# Patient Record
Sex: FEMALE | Race: BLACK OR AFRICAN AMERICAN | Employment: UNEMPLOYED | URBAN - METROPOLITAN AREA
[De-identification: names, ages, dates, MRNs, and addresses within clinical notes are randomized per-mention and may not be internally consistent; named-entity substitution may affect disease eponyms.]

---

## 2022-04-29 ENCOUNTER — APPOINTMENT (OUTPATIENT)
Dept: ULTRASOUND IMAGING | Facility: CLINIC | Age: 27
End: 2022-04-29
Attending: EMERGENCY MEDICINE

## 2022-04-29 ENCOUNTER — HOSPITAL ENCOUNTER (EMERGENCY)
Facility: CLINIC | Age: 27
Discharge: HOME | End: 2022-04-29
Attending: EMERGENCY MEDICINE | Admitting: EMERGENCY MEDICINE

## 2022-04-29 VITALS
DIASTOLIC BLOOD PRESSURE: 74 MMHG | RESPIRATION RATE: 16 BRPM | OXYGEN SATURATION: 97 % | WEIGHT: 125.2 LBS | HEART RATE: 103 BPM | TEMPERATURE: 98.2 F | SYSTOLIC BLOOD PRESSURE: 124 MMHG

## 2022-04-29 DIAGNOSIS — E05.90 HYPERTHYROIDISM: ICD-10-CM

## 2022-04-29 LAB
ALBUMIN SERPL-MCNC: 3.5 G/DL (ref 3.4–5)
ALBUMIN UR-MCNC: NEGATIVE MG/DL
ALP SERPL-CCNC: 182 U/L (ref 40–150)
ALT SERPL W P-5'-P-CCNC: 20 U/L (ref 0–50)
ANION GAP SERPL CALCULATED.3IONS-SCNC: 7 MMOL/L (ref 3–14)
APPEARANCE UR: CLEAR
AST SERPL W P-5'-P-CCNC: 10 U/L (ref 0–45)
BASOPHILS # BLD AUTO: 0 10E3/UL (ref 0–0.2)
BASOPHILS NFR BLD AUTO: 0 %
BILIRUB SERPL-MCNC: 0.4 MG/DL (ref 0.2–1.3)
BILIRUB UR QL STRIP: NEGATIVE
BUN SERPL-MCNC: 12 MG/DL (ref 7–30)
CALCIUM SERPL-MCNC: 10.1 MG/DL (ref 8.5–10.1)
CHLORIDE BLD-SCNC: 106 MMOL/L (ref 94–109)
CO2 SERPL-SCNC: 27 MMOL/L (ref 20–32)
COLOR UR AUTO: ABNORMAL
CREAT SERPL-MCNC: 0.4 MG/DL (ref 0.52–1.04)
EOSINOPHIL # BLD AUTO: 0.3 10E3/UL (ref 0–0.7)
EOSINOPHIL NFR BLD AUTO: 5 %
ERYTHROCYTE [DISTWIDTH] IN BLOOD BY AUTOMATED COUNT: 13.3 % (ref 10–15)
GFR SERPL CREATININE-BSD FRML MDRD: >90 ML/MIN/1.73M2
GLUCOSE BLD-MCNC: 92 MG/DL (ref 70–99)
GLUCOSE UR STRIP-MCNC: NEGATIVE MG/DL
HCG UR QL: NEGATIVE
HCT VFR BLD AUTO: 38.4 % (ref 35–47)
HGB BLD-MCNC: 12.6 G/DL (ref 11.7–15.7)
HGB UR QL STRIP: NEGATIVE
HOLD SPECIMEN: NORMAL
IMM GRANULOCYTES # BLD: 0 10E3/UL
IMM GRANULOCYTES NFR BLD: 0 %
KETONES UR STRIP-MCNC: NEGATIVE MG/DL
LEUKOCYTE ESTERASE UR QL STRIP: NEGATIVE
LYMPHOCYTES # BLD AUTO: 1.8 10E3/UL (ref 0.8–5.3)
LYMPHOCYTES NFR BLD AUTO: 36 %
MCH RBC QN AUTO: 25.5 PG (ref 26.5–33)
MCHC RBC AUTO-ENTMCNC: 32.8 G/DL (ref 31.5–36.5)
MCV RBC AUTO: 78 FL (ref 78–100)
MONOCYTES # BLD AUTO: 0.5 10E3/UL (ref 0–1.3)
MONOCYTES NFR BLD AUTO: 11 %
MUCOUS THREADS #/AREA URNS LPF: PRESENT /LPF
NEUTROPHILS # BLD AUTO: 2.4 10E3/UL (ref 1.6–8.3)
NEUTROPHILS NFR BLD AUTO: 48 %
NITRATE UR QL: NEGATIVE
NRBC # BLD AUTO: 0 10E3/UL
NRBC BLD AUTO-RTO: 0 /100
PH UR STRIP: 6.5 [PH] (ref 5–7)
PLATELET # BLD AUTO: 307 10E3/UL (ref 150–450)
POTASSIUM BLD-SCNC: 3.8 MMOL/L (ref 3.4–5.3)
PROT SERPL-MCNC: 8 G/DL (ref 6.8–8.8)
RBC # BLD AUTO: 4.95 10E6/UL (ref 3.8–5.2)
RBC URINE: 0 /HPF
SODIUM SERPL-SCNC: 140 MMOL/L (ref 133–144)
SP GR UR STRIP: 1.02 (ref 1–1.03)
SQUAMOUS EPITHELIAL: 4 /HPF
T4 FREE SERPL-MCNC: 4.22 NG/DL (ref 0.76–1.46)
TSH SERPL DL<=0.005 MIU/L-ACNC: <0.01 MU/L (ref 0.4–4)
UROBILINOGEN UR STRIP-MCNC: NORMAL MG/DL
WBC # BLD AUTO: 5 10E3/UL (ref 4–11)
WBC URINE: 0 /HPF

## 2022-04-29 PROCEDURE — 85025 COMPLETE CBC W/AUTO DIFF WBC: CPT | Performed by: EMERGENCY MEDICINE

## 2022-04-29 PROCEDURE — 99284 EMERGENCY DEPT VISIT MOD MDM: CPT | Mod: 25 | Performed by: EMERGENCY MEDICINE

## 2022-04-29 PROCEDURE — 81025 URINE PREGNANCY TEST: CPT | Performed by: EMERGENCY MEDICINE

## 2022-04-29 PROCEDURE — 80053 COMPREHEN METABOLIC PANEL: CPT | Performed by: EMERGENCY MEDICINE

## 2022-04-29 PROCEDURE — 36415 COLL VENOUS BLD VENIPUNCTURE: CPT | Performed by: EMERGENCY MEDICINE

## 2022-04-29 PROCEDURE — 84439 ASSAY OF FREE THYROXINE: CPT | Performed by: EMERGENCY MEDICINE

## 2022-04-29 PROCEDURE — 99284 EMERGENCY DEPT VISIT MOD MDM: CPT | Performed by: EMERGENCY MEDICINE

## 2022-04-29 PROCEDURE — 81001 URINALYSIS AUTO W/SCOPE: CPT | Performed by: EMERGENCY MEDICINE

## 2022-04-29 PROCEDURE — 76536 US EXAM OF HEAD AND NECK: CPT

## 2022-04-29 PROCEDURE — 84443 ASSAY THYROID STIM HORMONE: CPT | Performed by: EMERGENCY MEDICINE

## 2022-04-29 RX ORDER — METHIMAZOLE 10 MG/1
20 TABLET ORAL DAILY
Qty: 60 TABLET | Refills: 0 | Status: SHIPPED | OUTPATIENT
Start: 2022-04-29 | End: 2022-06-06

## 2022-04-29 RX ORDER — PROPRANOLOL HYDROCHLORIDE 40 MG/1
40 TABLET ORAL 2 TIMES DAILY
Qty: 60 TABLET | Refills: 0 | Status: SHIPPED | OUTPATIENT
Start: 2022-04-29 | End: 2022-06-06

## 2022-04-29 NOTE — ED TRIAGE NOTES
Hoarse voice     Triage Assessment     Row Name 04/29/22 1554       Triage Assessment (Adult)    Airway WDL airway symptoms    Airway Symptoms other (see comments)  hoarse       Respiratory WDL    Respiratory WDL WDL       Cardiac WDL    Cardiac WDL WDL

## 2022-04-29 NOTE — ED PROVIDER NOTES
[facility] EMERGENCY DEPARTMENT ([facility])       4/29/22  History     Chief Complaint   Patient presents with     Thyroid Problem     Treated for thyroid issues and neck swelling in [place]; arrived [place] Feb 16th, 2022; neck swelling has worsened; reports difficulty swallowing and breathing is difficulty     The history is provided by the patient, medical records and a significant other. A  was used (Armenian (patient's partner is able to translate for patient)).     Gabby Concepcion is a 27 year old female who presents to the Emergency Department for evaluation of throat swelling and difficulty swallowing.    Patient reports that she has been having ongoing chronic thyroid issues and neck swelling since living in [place].  She adds that she arrived to [place] on 02/16/2022.  She says that her thyroid issues are being treated with propanolol and Dimazol 10 mg (Carbimazole) and has been on these medications for about 1 year. Patient denies any missed doses of her medications. She notes that the medications used to help with her throat swelling and thyroid issues but recently the meds have not been working for her.  She says that within the last month her throat swelling has been increasing which has been causing difficulty with breathing and difficulty with swallowing.  She notes that the throat swelling is painful in nature.  She adds that she has been experiencing a heart racing sensation along with feeling slightly short of breath.  Patient reports that she does not have established care with a PCP for this in the [place].  Patient denies fever, chills, chest pain, cough, and shortness of breath.    Social history: Patient reports that she is currently fasting for Ramadan and is not allowed to break her fast until 8 PM.    No past medical history on file.    No past surgical history on file.    No family history on file.    Social History     Tobacco Use     Smoking status:  Not on file     Smokeless tobacco: Not on file   Substance Use Topics     Alcohol use: Not on file       No current facility-administered medications for this encounter.     Current Outpatient Medications   Medication     methimazole (TAPAZOLE) 10 MG tablet     propranolol (INDERAL) 40 MG tablet      No Known Allergies     I have reviewed the Medications, Allergies, Past Medical and Surgical History, and Social History in the Epic system.    Review of Systems  A complete review of systems was performed with pertinent positives and negatives noted in the HPI, and all other systems negative.    Physical Exam   BP: 123/77  Pulse: 95  Temp: 98.2  F (36.8  C)  Resp: 18  Weight: 56.8 kg (125 lb 3.2 oz)  SpO2: 97 %      Physical Exam  HENT:      Head: Normocephalic and atraumatic.      Nose: Nose normal.      Mouth/Throat:      Mouth: Mucous membranes are moist.   Eyes:      Extraocular Movements: Extraocular movements intact.      Pupils: Pupils are equal, round, and reactive to light.   Neck:      Comments: Diffusely enlarged and palpable thyroid    Airway is patent no signs of airway compromise patient is tolerating secretions  Cardiovascular:      Rate and Rhythm: Regular rhythm. Tachycardia present.      Pulses: Normal pulses.   Pulmonary:      Effort: Pulmonary effort is normal.      Breath sounds: No wheezing, rhonchi or rales.   Abdominal:      General: Abdomen is flat.      Palpations: Abdomen is soft.      Tenderness: There is no abdominal tenderness. There is no guarding or rebound.   Musculoskeletal:         General: No tenderness. Normal range of motion.      Cervical back: Normal range of motion.   Skin:     General: Skin is warm.   Neurological:      General: No focal deficit present.      Mental Status: She is alert and oriented to person, place, and time.         ED Course     At 4:12 PM the patient was seen and examined by Carlos Alberto Aggarwal MD in Room ED04.        Procedures              Results for orders  placed or performed during the hospital encounter of 04/29/22 (from the past 24 hour(s))   Taylor Draw    Narrative    The following orders were created for panel order Taylor Draw.  Procedure                               Abnormality         Status                     ---------                               -----------         ------                     Extra Blue Top Tube[[id number]]                              Final result               Extra Red Top Tube[[id number]]                               Final result               Extra Green Top (Lithium...[[id number]]                      Final result               Extra Purple Top Tube[[id number]]                            Final result                 Please view results for these tests on the individual orders.   Extra Blue Top Tube   Result Value Ref Range    Hold Specimen JIC    Extra Red Top Tube   Result Value Ref Range    Hold Specimen JIC    Extra Green Top (Lithium Heparin) Tube   Result Value Ref Range    Hold Specimen JIC    Extra Purple Top Tube   Result Value Ref Range    Hold Specimen JIC    CBC with platelets differential    Narrative    The following orders were created for panel order CBC with platelets differential.  Procedure                               Abnormality         Status                     ---------                               -----------         ------                     CBC with platelets and d...[[id number]]  Abnormal            Final result                 Please view results for these tests on the individual orders.   TSH with free T4 reflex   Result Value Ref Range    TSH <0.01 (L) 0.40 - 4.00 mU/L   Comprehensive metabolic panel   Result Value Ref Range    Sodium 140 133 - 144 mmol/L    Potassium 3.8 3.4 - 5.3 mmol/L    Chloride 106 94 - 109 mmol/L    Carbon Dioxide (CO2) 27 20 - 32 mmol/L    Anion Gap 7 3 - 14 mmol/L    Urea Nitrogen 12 7 - 30 mg/dL    Creatinine 0.40 (L) 0.52 - 1.04 mg/dL    Calcium 10.1 8.5 - 10.1 mg/dL     Glucose 92 70 - 99 mg/dL    Alkaline Phosphatase 182 (H) 40 - 150 U/L    AST 10 0 - 45 U/L    ALT 20 0 - 50 U/L    Protein Total 8.0 6.8 - 8.8 g/dL    Albumin 3.5 3.4 - 5.0 g/dL    Bilirubin Total 0.4 0.2 - 1.3 mg/dL    GFR Estimate >90 >60 mL/min/1.73m2   CBC with platelets and differential   Result Value Ref Range    WBC Count 5.0 4.0 - 11.0 10e3/uL    RBC Count 4.95 3.80 - 5.20 10e6/uL    Hemoglobin 12.6 11.7 - 15.7 g/dL    Hematocrit 38.4 35.0 - 47.0 %    MCV 78 78 - 100 fL    MCH 25.5 (L) 26.5 - 33.0 pg    MCHC 32.8 31.5 - 36.5 g/dL    RDW 13.3 10.0 - 15.0 %    Platelet Count 307 150 - 450 10e3/uL    % Neutrophils 48 %    % Lymphocytes 36 %    % Monocytes 11 %    % Eosinophils 5 %    % Basophils 0 %    % Immature Granulocytes 0 %    NRBCs per 100 WBC 0 <1 /100    Absolute Neutrophils 2.4 1.6 - 8.3 10e3/uL    Absolute Lymphocytes 1.8 0.8 - 5.3 10e3/uL    Absolute Monocytes 0.5 0.0 - 1.3 10e3/uL    Absolute Eosinophils 0.3 0.0 - 0.7 10e3/uL    Absolute Basophils 0.0 0.0 - 0.2 10e3/uL    Absolute Immature Granulocytes 0.0 <=0.4 10e3/uL    Absolute NRBCs 0.0 10e3/uL   T4 free   Result Value Ref Range    Free T4 4.22 (H) 0.76 - 1.46 ng/dL   UA with Microscopic reflex to Culture    Specimen: Urine, Clean Catch   Result Value Ref Range    Color Urine Light Yellow Colorless, Straw, Light Yellow, Yellow    Appearance Urine Clear Clear    Glucose Urine Negative Negative mg/dL    Bilirubin Urine Negative Negative    Ketones Urine Negative Negative mg/dL    Specific Gravity Urine 1.023 1.003 - 1.035    Blood Urine Negative Negative    pH Urine 6.5 5.0 - 7.0    Protein Albumin Urine Negative Negative mg/dL    Urobilinogen Urine Normal Normal, 2.0 mg/dL    Nitrite Urine Negative Negative    Leukocyte Esterase Urine Negative Negative    Mucus Urine Present (A) None Seen /LPF    RBC Urine 0 <=2 /HPF    WBC Urine 0 <=5 /HPF    Squamous Epithelials Urine 4 (H) <=1 /HPF    Narrative    Urine Culture not indicated   HCG  qualitative urine   Result Value Ref Range    hCG Urine Qualitative Negative Negative   US Head Neck Soft Tissue    Narrative    EXAM: US HEAD NECK SOFT TISSUE  LOCATION: [facility]  DATE/TIME: 4/29/2022 5:14 PM    INDICATION: Neck swelling.  COMPARISON: None.  TECHNIQUE: Routine.    FINDINGS:    RIGHT thyroid lobe measures 8.9 x 3 x 3.1 cm. Heterogeneous echotexture with no focal nodule.    LEFT thyroid lobe measures 8.5 x 3.1 x 3.8 cm. Heterogeneous echotexture with no focal nodule.    Isthmus measures 1.2 mm. No additional findings.    NODULES (characterize up to four nodules): None.    No cervical lymphadenopathy in the visualized portion of the neck.      Impression    IMPRESSION:  1. The thyroid gland is diffusely enlarged and heterogeneous.   2. No focal thyroid masses or nodules are identified.         Medications - No data to display          Assessments & Plan (with Medical Decision Making)   Patient nontoxic in no acute distress presents for neck swelling and feeling short of breath and having difficulty swallowing.  She is not stridorous she has no signs of airway compromise airway is patent.  Vitals are stable she does have tachycardia which in her history and setting and clinical picture are expected with hyperthyroidism.  She has a palpable enlarged thyroid anteriorly in the neck.  She is tolerating her secretions and able to swallow.  I do not feel that this is compromising her airway and that majority of her symptoms are due to her uncontrolled hyperthyroidism.  She is currently breast-feeding and plans to continue.  She does not have any local follow-up.  Laboratory work and ultrasound were obtained.  This fits the clinical picture of hyperthyroidism.  Pulses been in the low 100s.  Discussed with endocrinology to transition to US medications and appropriate new doses and to arrange for follow-up.  Following discussion with endocrinology plan is for  methimazole 20 mg daily and for propranolol 40 mg twice daily.  Patient can follow-up in endocrinology clinic for repeat labs and reassessment.  I discussed all this with the patient.  Outpatient referral is placed as well.  Discussed signs and symptoms to return to the emergency department.  Patient and family understand the plan.  Family has been interpreting throughout the entire visit.    I have reviewed the nursing notes.    I have reviewed the findings, diagnosis, plan and need for follow up with the patient.    New Prescriptions    METHIMAZOLE (TAPAZOLE) 10 MG TABLET    Take 2 tablets (20 mg) by mouth daily    PROPRANOLOL (INDERAL) 40 MG TABLET    Take 1 tablet (40 mg) by mouth 2 times daily       Final diagnoses:   Hyperthyroidism     IHeidy, am serving as a trained medical scribe to document services personally performed by Carlos Alberto Aggarwal MD, based on the provider's statements to me.      Carlos Alberto NIETO MD, was physically present and have reviewed and verified the accuracy of this note documented by Heidy Jamil.      Carlos Alberto Aggarwal MD  4/29/2022   [facility] EMERGENCY DEPARTMENT     Carlos Alberto Aggarwal MD  04/29/22 1952

## 2022-04-30 NOTE — DISCHARGE INSTRUCTIONS
Please make an appointment to follow up with endocrinology, Your Primary Care Provider, and Primary Care Center (phone: [phone]) in 14-21 days.

## 2022-05-16 ENCOUNTER — TELEPHONE (OUTPATIENT)
Dept: ENDOCRINOLOGY | Facility: CLINIC | Age: 27
End: 2022-05-16
Payer: COMMERCIAL

## 2022-05-16 NOTE — TELEPHONE ENCOUNTER
To schedulers : please schedule with consult service (or open new/ALTAGRACIA) within the week. This could be a virtual visit.      Cris Fagan MD  Endocrine triage

## 2022-05-16 NOTE — TELEPHONE ENCOUNTER
[facility] Call Center    Phone Message    May a detailed message be left on voicemail: yes     Reason for Call: Appointment Intake    Referring Provider Name: Carlos Alberto Aggarwal MD    Diagnosis and/or Symptoms: Hyperthyroidism [E05.90]     stated neck is swollen- was in ED on 4/29 and received medication.    Priority referral from 4/29: 1-2weeks.  Please review.      Scheduled next available in person on 10/5/22        Action Taken: Message routed to:  [facility] ([facility] endo    Travel Screening: Not Applicable

## 2022-05-17 NOTE — TELEPHONE ENCOUNTER
RECORDS RECEIVED FROM: internal    DATE RECEIVED: 5.18.22   NOTES (FOR ALL VISITS) STATUS DETAILS   OFFICE NOTES from referring provider internal  Carlos Alberto Aggarwal MD   OFFICE NOTES from other specialist     ED NOTES internal  4.29.22 Alessia CEDENO    OPERATIVE REPORT  (thyroid, pituitary, adrenal, parathyroid)     MEDICATION LIST internal     IMAGING      DEXASCAN     MRI (BRAIN)     XR (Chest)     CT (HEAD/NECK/CHEST/ABDOMEN)     NUCLEAR      ULTRASOUND (HEAD/NECK) internal  4.29.22    LABS     DIABETES: HBGA1C, CREATININE, FASTING LIPIDS, MICROALBUMIN URINE, POTASSIUM, TSH, T4    THYROID: TSH, T4, CBC, THYRODLONULIN, TOTAL T3, FREE T4, CALCITONIN, CEA internal  CMP- 4.29.22  TSH/T4- 4.29.22   CMP- 4.29.22

## 2022-05-18 ENCOUNTER — VIRTUAL VISIT (OUTPATIENT)
Dept: ENDOCRINOLOGY | Facility: CLINIC | Age: 27
End: 2022-05-18
Payer: COMMERCIAL

## 2022-05-18 ENCOUNTER — PRE VISIT (OUTPATIENT)
Dept: ENDOCRINOLOGY | Facility: CLINIC | Age: 27
End: 2022-05-18
Payer: COMMERCIAL

## 2022-05-18 DIAGNOSIS — E05.90 HYPERTHYROIDISM: ICD-10-CM

## 2022-05-18 DIAGNOSIS — E05.00 GRAVES' DISEASE: Primary | ICD-10-CM

## 2022-05-18 PROCEDURE — 99205 OFFICE O/P NEW HI 60 MIN: CPT | Mod: GT | Performed by: INTERNAL MEDICINE

## 2022-05-18 NOTE — PROGRESS NOTES
Gabby is a 27 year old who is being evaluated via a billable video visit.      How would you like to obtain your AVS? MyChart  If the video visit is dropped, the invitation should be resent by: Text to cell phone: [phone]   Will anyone else be joining your video visit? No     Video visit  Start: 4:30 pm  End: 5:20 pm   Doximity and with                                                                              - Endocrinology Initial Consultation -    Reason for visit/consult:  Graves' disease    Primary care provider: No Ref-Primary, Physician    HPI: A 28 yo female here for the evaluation of her hyperthyroidism.   The patient came from [place] 3 months ago to [place].    Danish  accompanied for this session.  Patient was diagnosed hypothyroidism 4 years ago when she was in [place].  She recalls she has been taking methimazole 10 mg twice a day and beta-blocker.  Last year when she was in pregnancy she recalls that she was still taking methimazole 10 mg twice a day, she was told after the pregnancy thyroid level need to be checked about 6 months ago she has not checked and came to [place] 3 months ago.  She brought to methimazole and has been taking however she started to have worsening palpitation shortness of breath neck tenderness and feeling shaky for the first few months therefore she went to emergency department, her free T4 was 4.2 and TSH was undetectable she was prescribed methimazole 20 mg twice a daily and propranolol.  Currently she is feeling better.  She has a 6 and half months of the child and has been on breast-feeding.  Also she mentioned 3 months ago she received the second vaccine of COVID-19 by the [employer]. She also mentioned health concern related to future second pregnancy.      Past Medical/Surgical History:  No past medical history on file.  No past surgical history on file.    Allergies:  No Known Allergies    Current Medications   Current Outpatient  Medications   Medication     methimazole (TAPAZOLE) 10 MG tablet     propranolol (INDERAL) 40 MG tablet     No current facility-administered medications for this visit.       Family History:  No family history on file.    Social History:  Social History     Tobacco Use     Smoking status: Never Smoker     Smokeless tobacco: Never Used   Substance Use Topics     Alcohol use: Not on file   came to [place] in early 2022.     ROS:  Full review of systems taken with the help of the intake sheet. Otherwise a complete 14 point review of systems was taken and is negative unless stated in the history above.      Physical Exam:   Vitals: [facility] 04/02/2022   BMI= There is no height or weight on file to calculate BMI.   General: well appearing, no acute distress, pleasant and conversant,   Mental Status/neuro: alert and oriented  Face: symmetrical, normal facial color  Eyes: anicteric, no proptosis or lid lag  Resp: breathing normally  Hands: no tremor    Labs : I reviewed data from epic and extract and summarize the pertinent data here.   Lab Results   Component Value Date     04/29/2022      Lab Results   Component Value Date    POTASSIUM 3.8 04/29/2022     Lab Results   Component Value Date    CHLORIDE 106 04/29/2022     Lab Results   Component Value Date    BRUNO 10.1 04/29/2022     Lab Results   Component Value Date    CO2 27 04/29/2022     Lab Results   Component Value Date    BUN 12 04/29/2022     Lab Results   Component Value Date    CR 0.40 04/29/2022     Lab Results   Component Value Date    GLC 92 04/29/2022     Lab Results   Component Value Date    TSH <0.01 04/29/2022     Lab Results   Component Value Date    T4 4.22 04/29/2022     No results found for: A1C    No results found for: IGF1  No results found for: LH  No results found for: FSH  No results found for: ESTROGEN  No results found for: PROLACTIN        MRI Brain: I personally reviewed the original images and agree with the below reports.   No results found for  this or any previous visit.          Assessment and Plan  27 year old female with hyperthyroidism    Likely Grave's disease    - recheck TSH, free T4, total T3  - TSI, TSH receptor anb    - meanwhile continue current methimazole 20 mg BID and propranolol 40 mg BID    RTC with me in 3 months    Addendum: will continue current dose of methiamzole then recheck in 2 month.      Latest Reference Range & Units 05/20/22 18:00   T4 Free 0.76 - 1.46 ng/dL 2.70 (H)   Triiodothyronine (T3) 60 - 181 ng/dL 241 (H)   TSH 0.40 - 4.00 mU/L <0.01 (L)   (H): Data is abnormally high  (L): Data is abnormally low      Total 60 minutes spent on the date of the encounter doing chart review, history and exam, answering questions from the patient, documentation and further activities as noted above.        Aletha Melton MD  Staff Physician  Endocrinology and Metabolism  License: [id number]

## 2022-05-18 NOTE — LETTER
Date:May 19, 2022      Provider requested that no letter be sent. Do not send.       [facility]

## 2022-05-18 NOTE — LETTER
5/18/2022       RE: Gabby Concepcion  [address]  [place] [facility] [address]     Dear Colleague,    Thank you for referring your patient, Gabby Concepcion, to the [facility] ENDOCRINOLOGY CLINIC [place] [facility]. Please see a copy of my visit note below.    Gabby is a 27 year old who is being evaluated via a billable video visit.      How would you like to obtain your AVS? MyChart  If the video visit is dropped, the invitation should be resent by: Text to cell phone: [phone]   Will anyone else be joining your video visit? No     Video visit  Start: 4:30 pm  End: 5:20 pm   Doximity and with                                                                              - Endocrinology Initial Consultation -    Reason for visit/consult:  Graves' disease    Primary care provider: No Ref-Primary, Physician    HPI: A 28 yo female here for the evaluation of her hyperthyroidism.   The patient came from [place] 3 months ago to [place].    Romansh  accompanied for this session.  Patient was diagnosed hypothyroidism 4 years ago when she was in [place].  She recalls she has been taking methimazole 10 mg twice a day and beta-blocker.  Last year when she was in pregnancy she recalls that she was still taking methimazole 10 mg twice a day, she was told after the pregnancy thyroid level need to be checked about 6 months ago she has not checked and came to [place] 3 [place] ago.  She brought to methimazole and has been taking however she started to have worsening palpitation shortness of breath neck tenderness and feeling shaky for the first few months therefore she went to emergency department, her free T4 was 4.2 and TSH was undetectable she was prescribed methimazole 20 mg twice a daily and propranolol.  Currently she is feeling better.  She has a 6 and half months of the child and has been on breast-feeding.  Also she mentioned 3 months  ago she received the second vaccine of COVID-19 by the [employer]. She also mentioned health concern related to future second pregnancy.      Past Medical/Surgical History:  No past medical history on file.  No past surgical history on file.    Allergies:  No Known Allergies    Current Medications   Current Outpatient Medications   Medication     methimazole (TAPAZOLE) 10 MG tablet     propranolol (INDERAL) 40 MG tablet     No current facility-administered medications for this visit.       Family History:  No family history on file.    Social History:  Social History     Tobacco Use     Smoking status: Never Smoker     Smokeless tobacco: Never Used   Substance Use Topics     Alcohol use: Not on file   came to [place] in early [place]22.     ROS:  Full review of systems taken with the help of the intake sheet. Otherwise a complete 14 point review of systems was taken and is negative unless stated in the history above.      Physical Exam:   Vitals: [facility] 04/02/2022   BMI= There is no height or weight on file to calculate BMI.   General: well appearing, no acute distress, pleasant and conversant,   Mental Status/neuro: alert and oriented  Face: symmetrical, normal facial color  Eyes: anicteric, no proptosis or lid lag  Resp: breathing normally  Hands: no tremor    Labs : I reviewed data from epic and extract and summarize the pertinent data here.   Lab Results   Component Value Date     04/29/2022      Lab Results   Component Value Date    POTASSIUM 3.8 04/29/2022     Lab Results   Component Value Date    CHLORIDE 106 04/29/2022     Lab Results   Component Value Date    BRUNO 10.1 04/29/2022     Lab Results   Component Value Date    CO2 27 04/29/2022     Lab Results   Component Value Date    BUN 12 04/29/2022     Lab Results   Component Value Date    CR 0.40 04/29/2022     Lab Results   Component Value Date    GLC 92 04/29/2022     Lab Results   Component Value Date    TSH <0.01 04/29/2022     Lab Results   Component Value Date     T4 4.22 04/29/2022     No results found for: A1C    No results found for: IGF1  No results found for: LH  No results found for: FSH  No results found for: ESTROGEN  No results found for: PROLACTIN        MRI Brain: I personally reviewed the original images and agree with the below reports.   No results found for this or any previous visit.          Assessment and Plan  27 year old female with hyperthyroidism    Likely Grave's disease    - recheck TSH, free T4, total T3  - TSI, TSH receptor anb    - meanwhile continue current methimazole 20 mg BID and propranolol 40 mg BID    RTC with me in 3 months      Total 60 minutes spent on the date of the encounter doing chart review, history and exam, answering questions from the patient, documentation and further activities as noted above.        Aletha Melton MD  Staff Physician  Endocrinology and Metabolism  License: [id number]        Again, thank you for allowing me to participate in the care of your patient.      Sincerely,    Aletha Melton MD

## 2022-05-19 ENCOUNTER — TELEPHONE (OUTPATIENT)
Dept: ENDOCRINOLOGY | Facility: CLINIC | Age: 27
End: 2022-05-19
Payer: COMMERCIAL

## 2022-05-19 NOTE — TELEPHONE ENCOUNTER
Inst[facility] to schedule lab draw [facility] with language [facility].   Eloisa Hooper RN on 5/19/2022 at 8:08 AM

## 2022-05-19 NOTE — TELEPHONE ENCOUNTER
----- Message from Aletha Melton MD sent at 5/18/2022  4:59 PM CDT -----  Regarding: could you communicating with her and assist to set up lab work  She is Georgian speaking, just came to [place] 3 months ago and confused [employer] [facility] system.     Could you explain her with  and assist to set up lab appt?    Thank you very much    Aletha

## 2022-05-19 NOTE — LETTER
"Dr Geronimo Rocha has placed lab orders for you. Please call  to schedule the lab draw at the [facility] lab that is most convenient for you at a time that works best for you. Dr Melton will be able to see the results as soon as they are available.     If you  prefer to set up the lab visit through [employer] here are the steps:     How do I schedule a lab-only appointment?  You can request lab only appointments through [employer] or schedule with a primary care clinic. Your provider will order the tests.    To schedule an appointment, click on the \"Schedule an Appointment\" option under the Find Care section of your menu.  Select Lab Only for the reason for visit  Choose the clinic location you would like the lab drawn and click Continue  Choose the Date and Time for the appointment  Verify your personal information, click \"This information is Correct\"  Verify your insurance, click \"This information is Correct\"  Type comments in the space provided  Click Schedule      Thank you.       ãÑÍÈÇ ÏáíáÉ      áÞÏ æÖÚ ÇáÏßÊæÑ ÃÑÇßí ØáÈÇÊ ãÚãáíÉ áß. íÑÌì ÇáÇÊÕÇá Úáì [id number] áÊÍÏíÏ ãæÚÏ ÇáÓÍÈ ÇáãÎÊÈÑí Ýí ãÎÊÈÑ [facility] ÇáÃßËÑ ãáÇÁãÉ áß Ýí ÇáæÞÊ ÇáÐí íäÇÓÈß ÈÔßá ÃÝÖá. ÓíÊãßä ÇáÏßÊæÑ ÃÑÇßí ãä ÑÄíÉ ÇáäÊÇÆÌ ÈãÌÑÏ ÊæÝÑåÇ.    ÅÐÇ ßäÊ ÊÝÖá ÅÚÏÇÏ ÒíÇÑÉ ÇáãÎÊÈÑ ãä ÎáÇá [employer] ÝÅáíß ÇáÎØæÇÊ:    ßíÝ ÃÍÏÏ ãæÚÏðÇ ãÚãáðÇ ÝÞØ   íãßäß ØáÈ ÇáãæÇÚíÏ ÇáãÎÊÈÑíÉ ÝÞØ ãä ÎáÇá MyChart Ãæ ÊÍÏíÏ ãæÚÏ ãÚ ÚíÇÏÉ ÇáÑÚÇíÉ ÇáÃæáíÉ. ÓíØáÈ ãÒæÏß ÇáÇÎÊÈÇÑÇÊ.    áÊÍÏíÏ ãæÚÏ   ÇäÞÑ ÝæÞ ÎíÇÑ \"ÊÍÏíÏ ãæÚÏ\" Öãä ÞÓã \"ÇáÈÍË Úä ÑÚÇíÉ\" Ýí ÞÇÆãÊß.  ÍÏÏ Lab Only áÓÈÈ ÇáÒíÇÑÉ  ÇÎÊÑ ãæÞÚ ÇáÚíÇÏÉ ÇáÐí ÊÑÛÈ Ýí ÑÓã ÇáãÚãá Èå æÇäÞÑ ÝæÞ \"ãÊÇÈÚÉ\"  ÇÎÊÑ ÊÇÑíÎ ææÞÊ ÇáãæÚÏ  ÊÍÞÞ ãä ãÚáæãÇÊß ÇáÔÎÕíÉ   æÇäÞÑ Úáì \"åÐå ÇáãÚáæãÇÊ ÕÍíÍÉ\"  ÊÍÞÞ ãä ÇáÊÃãíä ÇáÎÇÕ Èß   ÇäÞÑ ÝæÞ \"åÐå ÇáãÚáæãÇÊ ÕÍíÍÉ\"  ÇßÊÈ ÇáÊÚáíÞÇÊ Ýí ÇáãÓÇÍÉ ÇáãÊæÝÑÉ  ÇäÞÑ ÝæÞ ÌÏæáÉ      ÔßÑðÇ áß.  marhaban dalilat , laqad wadae alduktur 'araki talabat maemaliatan lika. janay " "aliatisal ealaa [id number] litahdid maweid alsahb almukhtabarii fi mukhtabar [facility] al'akthar jerry'amatan lak fi alwaqt aladhi yunasibuk bishakl 'afdala. sayatamakan alduktur 'araki min ruyat alnatayij bimujarad tawafurha.  'iidha kunt tufadil 'iiedad ziarat almukhtabar min khilal MyCHart fa'iilayk alkhutawati:  kayf 'uhadid mwedan memlan faqat?  yumkinuk talab almawaeid almukhtabariat faqat min khilal MyChart 'aw tahdid maweid perez eiadat alrieayat al'awaliati. sayatlub muzawadak alaikhtibarati.  litahdid maweid , ainqur fawq khiar \"tahdid maweidi\" dimn qism \"albahth mode rieayatin\" fi qayimatika.  hadid Lab Only lisabab alziyara  aikhtar mawqie aleiadat aladhi targhab fi rasm almaemal bih wanqur fawq \"mutabaeati\"  aikhtar tarikh wawaqt almaweid  tahaqaq min maelumatik alshakhsiat , wainqarr ealaa \"hadhih almaelumat sahihatun\"  tahaqaq min altaamin alkhasi bik , ainqur fawq \"hadhih almaelumat sahihatun\"  auktub altaeliqat fi almisahat almutawafira  ainqur fawq jadwala  shkran salbador.  "

## 2022-05-20 ENCOUNTER — LAB (OUTPATIENT)
Dept: LAB | Facility: CLINIC | Age: 27
End: 2022-05-20

## 2022-05-20 DIAGNOSIS — E05.00 GRAVES' DISEASE: ICD-10-CM

## 2022-05-20 PROCEDURE — 84445 ASSAY OF TSI GLOBULIN: CPT | Mod: 90

## 2022-05-20 PROCEDURE — 84439 ASSAY OF FREE THYROXINE: CPT

## 2022-05-20 PROCEDURE — 83520 IMMUNOASSAY QUANT NOS NONAB: CPT | Mod: 90

## 2022-05-20 PROCEDURE — 84480 ASSAY TRIIODOTHYRONINE (T3): CPT

## 2022-05-20 PROCEDURE — 99000 SPECIMEN HANDLING OFFICE-LAB: CPT

## 2022-05-20 PROCEDURE — 84443 ASSAY THYROID STIM HORMONE: CPT

## 2022-05-20 PROCEDURE — 36415 COLL VENOUS BLD VENIPUNCTURE: CPT

## 2022-05-21 LAB
T3 SERPL-MCNC: 241 NG/DL (ref 60–181)
T4 FREE SERPL-MCNC: 2.7 NG/DL (ref 0.76–1.46)
TSH SERPL DL<=0.005 MIU/L-ACNC: <0.01 MU/L (ref 0.4–4)

## 2022-05-24 LAB — TSH RECEP AB SER-ACNC: 21 IU/L (ref 0–1.75)

## 2022-05-27 LAB — TSI SER-ACNC: 4.8 TSI INDEX

## 2022-06-06 ENCOUNTER — TELEPHONE (OUTPATIENT)
Dept: ENDOCRINOLOGY | Facility: CLINIC | Age: 27
End: 2022-06-06
Payer: COMMERCIAL

## 2022-06-06 DIAGNOSIS — E05.00 GRAVES' DISEASE: Primary | ICD-10-CM

## 2022-06-06 NOTE — TELEPHONE ENCOUNTER
[facility] Call Center    Phone Message    May a detailed message be left on voicemail: yes     Reason for Call: Other: Ro[facility] calling wondering if they could get patients lab results from 5/20/22. Roque also wondering if patient can be prescribed scripts propranolol (INDERAL) 40 MG tablet and methimazole (TAPAZOLE) 10 MG tablet Please call back and advise, thank you.    Action Taken: Message routed to:  Other: endo    Travel Screening: Not Applicable

## 2022-06-07 RX ORDER — PROPRANOLOL HYDROCHLORIDE 40 MG/1
40 TABLET ORAL 2 TIMES DAILY
Qty: 120 TABLET | Refills: 2 | Status: SHIPPED | OUTPATIENT
Start: 2022-06-07 | End: 2022-09-02 | Stop reason: DRUGHIGH

## 2022-06-07 RX ORDER — PROPRANOLOL HYDROCHLORIDE 40 MG/1
40 TABLET ORAL 2 TIMES DAILY
Qty: 60 TABLET | Refills: 1 | Status: SHIPPED | OUTPATIENT
Start: 2022-06-07 | End: 2022-06-07

## 2022-06-07 RX ORDER — METHIMAZOLE 10 MG/1
20 TABLET ORAL DAILY
Qty: 60 TABLET | Refills: 1 | Status: SHIPPED | OUTPATIENT
Start: 2022-06-07 | End: 2022-06-07

## 2022-06-07 RX ORDER — METHIMAZOLE 10 MG/1
20 TABLET ORAL 2 TIMES DAILY
Qty: 120 TABLET | Refills: 2 | Status: SHIPPED | OUTPATIENT
Start: 2022-06-07 | End: 2022-08-26 | Stop reason: DRUGHIGH

## 2022-06-07 RX ORDER — METHIMAZOLE 10 MG/1
20 TABLET ORAL 2 TIMES DAILY
Qty: 60 TABLET | Refills: 1 | Status: SHIPPED | OUTPATIENT
Start: 2022-06-07 | End: 2022-06-07

## 2022-06-07 NOTE — TELEPHONE ENCOUNTER
Spoke w/ kane patino and Pt [phone]   Confirms Pt is taking methimazole 20mg twice daily and propranolol twice daily.   On-call provider notified. Rx pended.   Eloisa Hooper RN on 6/7/2022 at 10:19 AM         Endocrine team, please contact patient and clarify what dose of methimazole she has actually been taking.  -ED note and prescription indicates she is to be taking methimazole 20 mg once daily and propranolol 40 mg twice daily  -Dr. Melton's note mentions she is taking methimazole 20 mg twice daily and propranolol twice daily  -Also, please let her know that thyroid function tests are improving based on labs from 5/28/2022     Rabia Parks MD 6/6/2022 7:46 PM

## 2022-06-07 NOTE — TELEPHONE ENCOUNTER
Endocrine team, please contact patient and clarify what dose of methimazole she has actually been taking.  -ED note and prescription indicates she is to be taking methimazole 20 mg once daily and propranolol 40 mg twice daily  -Dr. Melton's note mentions she is taking methimazole 20 mg twice daily and propranolol twice daily  -Also, please let her know that thyroid function tests are improving based on labs from 5/28/2022    Rabia Parks MD 6/6/2022 7:46 PM

## 2022-06-07 NOTE — TELEPHONE ENCOUNTER
Jude calling requesting the status of below medication requests. Please call to discuss thank you.

## 2022-06-08 NOTE — TELEPHONE ENCOUNTER
Jude called stating pharmacy was not able to give patient the methimazole as the directions were unclear. Please advise.    Pharmacy called shortly after stating one script stated 2 tablets twice a day and the other 2 tablets once a day. Pharmacy needing clarification on correct script as 3 were sent.

## 2022-06-22 ENCOUNTER — TELEPHONE (OUTPATIENT)
Dept: OPTOMETRY | Facility: CLINIC | Age: 27
End: 2022-06-22

## 2022-06-22 ENCOUNTER — OFFICE VISIT (OUTPATIENT)
Dept: OPTOMETRY | Facility: CLINIC | Age: 27
End: 2022-06-22
Payer: COMMERCIAL

## 2022-06-22 DIAGNOSIS — H52.03 HYPEROPIA, BILATERAL: ICD-10-CM

## 2022-06-22 DIAGNOSIS — H52.223 REGULAR ASTIGMATISM OF BOTH EYES: Primary | ICD-10-CM

## 2022-06-22 PROCEDURE — 92004 COMPRE OPH EXAM NEW PT 1/>: CPT | Performed by: OPTOMETRIST

## 2022-06-22 PROCEDURE — 92015 DETERMINE REFRACTIVE STATE: CPT | Performed by: OPTOMETRIST

## 2022-06-22 ASSESSMENT — REFRACTION_MANIFEST
OS_CYLINDER: -2.50
OD_CYLINDER: -2.25
OD_CYLINDER: +2.25
METHOD_AUTOREFRACTION: 1
OD_AXIS: 120
OD_AXIS: 030
OS_CYLINDER: +2.00
OS_SPHERE: +0.50
OS_AXIS: 066
OS_AXIS: 155
OS_SPHERE: +2.00
OD_SPHERE: +2.25
OD_SPHERE: PLANO

## 2022-06-22 ASSESSMENT — REFRACTION_CURRENTRX
OS_BRAND: ALCON AIR OPTIX FOR ASTIGMATISM BC 8.7, D 14.5
OD_CYLINDER: -2.25
OD_SPHERE: +2.00
OD_BRAND: ALCON AIR OPTIX FOR ASTIGMATISM BC 8.7, D 14.5
OD_AXIS: 030
OS_AXIS: 160
OS_SPHERE: +2.00
OS_CYLINDER: -2.25

## 2022-06-22 ASSESSMENT — VISUAL ACUITY
OD_SC: 20/50
OS_SC: 20/40
OD_CC: 20/30
OS_CC: 20/30 +1
METHOD: SNELLEN - LINEAR
OS_SC+: -1
OD_SC+: -2
OS_PH_SC: 20/25
OD_PH_SC: 20/25
OS_PH_SC+: -1
OD_PH_SC+: +2

## 2022-06-22 ASSESSMENT — EXTERNAL EXAM - LEFT EYE: OS_EXAM: NORMAL

## 2022-06-22 ASSESSMENT — TONOMETRY
IOP_METHOD: TONOPEN
OD_IOP_MMHG: 14
OS_IOP_MMHG: 14

## 2022-06-22 ASSESSMENT — CONF VISUAL FIELD
OS_NORMAL: 1
OD_NORMAL: 1

## 2022-06-22 ASSESSMENT — SLIT LAMP EXAM - LIDS
COMMENTS: NORMAL
COMMENTS: NORMAL

## 2022-06-22 ASSESSMENT — CUP TO DISC RATIO
OD_RATIO: 0.25
OS_RATIO: 0.25

## 2022-06-22 ASSESSMENT — EXTERNAL EXAM - RIGHT EYE: OD_EXAM: NORMAL

## 2022-06-22 NOTE — LETTER
6/22/2022         RE: Gabby Concepcion  [address]  [place] [address]        Dear Colleague,    Thank you for referring your patient, Gabby Concepcion, to the [facility]. Please see a copy of my visit note below.    Chief Complaint   Patient presents with     COMPREHENSIVE EYE EXAM      Accompanied by  who is acting as her   Last Eye Exam:  First eye exam  Dilated Previously: No, side effects of dilation explained today    What are you currently using to see?  does not use glasses or contacts       Distance Vision Acuity: Noticed gradual change in both eyes    Near Vision Acuity: she has to hold thing close to read    Eye Comfort: good  Do you use eye drops? : No  Occupation or Hobbies: homemaker    Em Lampreccarlene  Optometric Assistant, [facility]            Medical, surgical and family histories reviewed and updated 6/22/2022.       OBJECTIVE: See Ophthalmology exam    ASSESSMENT:    ICD-10-CM    1. Regular astigmatism of both eyes - Both Eyes  H52.223 EYE EXAM (SIMPLE-NONBILLABLE)     REFRACTION   2. Hyperopia, bilateral - Both Eyes  H52.03 EYE EXAM (SIMPLE-NONBILLABLE)     REFRACTION       PLAN:     Patient Instructions   Astigmatism results from curvature differential in the cornea and crystalline lens which can cause a distorted image, as light rays are prevented from meeting at a common focus.     Hyperopia is far-sightedness. Hyperopia is commonly rooted from a petite eye length. That results in the light's focus behind the retina.     Eyeglass prescription given.    The affects of the dilating drops last for 4- 6 hours.  You will be more sensitive to light and vision will be blurry up close.  Do not drive if you do not feel comfortable.  Mydriatic sunglasses were given if needed.    Recommend annual eye exams.    Diana Rahman O.D.  [facility]   [address] [place] [address]    [phone]           Again, thank you for allowing me to  participate in the care of your patient.        Sincerely,        Diana Rahman OD

## 2022-06-22 NOTE — PATIENT INSTRUCTIONS
Astigmatism results from curvature differential in the cornea and crystalline lens which can cause a distorted image, as light rays are prevented from meeting at a common focus.     Hyperopia is far-sightedness. Hyperopia is commonly rooted from a petite eye length. That results in the light's focus behind the retina.     Eyeglass prescription given.    The affects of the dilating drops last for 4- 6 hours.  You will be more sensitive to light and vision will be blurry up close.  Do not drive if you do not feel comfortable.  Mydriatic sunglasses were given if needed.    Recommend annual eye exams.    Diana Rahman O.D.  [facility]   [address] [place], [place] [address] [phone]

## 2022-06-22 NOTE — TELEPHONE ENCOUNTER
Current Contact Lens Rx (Ordered)     Brand Sphere Cylinder Axis   Right Inocente Air Optix for Astigmatism BC 8.7, D 14.5 +2.00 -2.25 030   Left Inocente Air Optix for Astigmatism BC 8.7, D 14.5 +2.00 -2.25 160     Trial contacts ordered. When in call Gabby and schedule I & R appointment. She is aware of the fit fee and needs the waiver signed. Her  needs to interpret for her.

## 2022-06-27 ENCOUNTER — PATIENT OUTREACH (OUTPATIENT)
Dept: ENDOCRINOLOGY | Facility: CLINIC | Age: 27
End: 2022-06-27

## 2022-06-27 NOTE — LETTER
June 27, 2022          Gabby Concepcion  [address]  [place] [address]        Dear Gabby Concepcion:    We recently reviewed your medical records from [facility] and found that you are due for an appointment with Dr. Aletha Melton and labs in August.  Our office has attempted to reach you via telephone and left a message.    At your earliest convenience, please call the clinic at [phone] to arrange the recommended exam and possible testing.  Please kindly mention this letter when calling so that your appointment(s) can be accurately scheduled.      Sincerely,       The Clinic Staff        Medical Record Number:  [id number]

## 2022-06-27 NOTE — PROGRESS NOTES
Attempted to reach patient to schedule follow up in the Endocrinology Clinic.  GAUDENCIO with assistance of an Malay  and Letter mailed.     Schedule with Dr. Aletha patel in August.

## 2022-07-01 ENCOUNTER — APPOINTMENT (OUTPATIENT)
Dept: OPTOMETRY | Facility: CLINIC | Age: 27
End: 2022-07-01
Payer: COMMERCIAL

## 2022-07-01 PROCEDURE — 92340 FIT SPECTACLES MONOFOCAL: CPT | Performed by: OPTOMETRIST

## 2022-08-20 ENCOUNTER — LAB (OUTPATIENT)
Dept: LAB | Facility: CLINIC | Age: 27
End: 2022-08-20
Payer: COMMERCIAL

## 2022-08-20 DIAGNOSIS — E05.00 GRAVES' DISEASE: ICD-10-CM

## 2022-08-20 PROCEDURE — 84439 ASSAY OF FREE THYROXINE: CPT

## 2022-08-20 PROCEDURE — 84480 ASSAY TRIIODOTHYRONINE (T3): CPT

## 2022-08-20 PROCEDURE — 36415 COLL VENOUS BLD VENIPUNCTURE: CPT

## 2022-08-20 PROCEDURE — 84443 ASSAY THYROID STIM HORMONE: CPT

## 2022-08-21 LAB
T3 SERPL-MCNC: 135 NG/DL (ref 85–202)
T4 FREE SERPL-MCNC: 0.82 NG/DL (ref 0.76–1.46)
TSH SERPL DL<=0.005 MIU/L-ACNC: <0.01 MU/L (ref 0.4–4)

## 2022-08-24 DIAGNOSIS — E05.00 GRAVES' DISEASE: Primary | ICD-10-CM

## 2022-08-24 NOTE — RESULT ENCOUNTER NOTE
We are on the right direction, please decrase methimazole from 20 mg daily to 10 mg daily (half dose) then repeat blood work in 2 month.     Aletha Melton MD    Patient does not my chart pls communicate.

## 2022-08-25 ENCOUNTER — TELEPHONE (OUTPATIENT)
Dept: ENDOCRINOLOGY | Facility: CLINIC | Age: 27
End: 2022-08-25

## 2022-08-25 NOTE — TELEPHONE ENCOUNTER
Spoke w/ Pt Via Mohawk speaking . Clarification needed. Pt is currently taking 40mg daily.   Provider notified.  Eloisa Hooper RN on 8/25/2022 at 9:36 AM       RE     Aletha Melton MD   8/24/2022  6:18 PM CDT Back to Top        We are on the right direction, please decrase methimazole from 20 mg daily to 10 mg daily (half dose) then repeat blood work in 2 month.      Aletha Melton MD     Patient does not my chart pls communicate.      methimazole (TAPAZOLE) 10 MG tablet 120 tablet 2 6/7/2022  No   Sig - Route: Take 2 tablets (20 mg) by mouth 2 times daily Lab draw needed late July 2022. - Oral   Sent to pharmacy as: methIMAzole 10 MG Oral Tablet (TAPAZOLE)

## 2022-08-26 ENCOUNTER — TELEPHONE (OUTPATIENT)
Dept: ENDOCRINOLOGY | Facility: CLINIC | Age: 27
End: 2022-08-26

## 2022-08-26 DIAGNOSIS — E05.00 GRAVES' DISEASE: Primary | ICD-10-CM

## 2022-08-26 RX ORDER — METHIMAZOLE 10 MG/1
10 TABLET ORAL 2 TIMES DAILY
Qty: 180 TABLET | Refills: 3 | Status: SHIPPED | OUTPATIENT
Start: 2022-08-26 | End: 2022-09-06

## 2022-08-26 NOTE — TELEPHONE ENCOUNTER
----- Message from Aletha Melton MD sent at 8/26/2022  1:31 PM CDT -----  Regarding: methimazole  Clarified,     We will reduce methimazole from 40 mg daily to 20 mg daily (10 mg po BID).     Thank you!!    Aletha

## 2022-08-26 NOTE — TELEPHONE ENCOUNTER
Spoke w/ Pt: Via Nepali speaking . Confirms understanding of review and recommendation from Dr Melton for methimazole.     Asking about the propranolol dose. Should that remain the same?   Provider notified.   Eloisa Hooper RN on 8/26/2022 at 2:10 PM       RE    methimazole  Received: Today  Aletha Melton MD Miller, Eloisa BAHENA RN  Clarified,     We will reduce methimazole from 40 mg daily to 20 mg daily (10 mg po BID).

## 2022-08-26 NOTE — TELEPHONE ENCOUNTER
Re    Me     DM    8/25/22 9:37 AM  Note  Spoke w/ Pt Via Spanish speaking . Clarification needed. Pt is currently taking 40mg daily.   Provider notified.  Eloisa Hooper RN on 8/25/2022 at 9:36 AM         RE       Aletha Melton MD   8/24/2022  6:18 PM CDT Back to Top         We are on the right direction, please decrase methimazole from 20 mg daily to 10 mg daily (half dose) then repeat blood work in 2 month.      Aletha Melton MD     Patient does not my chart pls communicate.

## 2022-09-06 DIAGNOSIS — E05.00 GRAVES' DISEASE: ICD-10-CM

## 2022-09-06 RX ORDER — METHIMAZOLE 10 MG/1
10 TABLET ORAL DAILY
Qty: 180 TABLET | Refills: 0 | Status: SHIPPED | OUTPATIENT
Start: 2022-09-06

## 2022-09-06 NOTE — TELEPHONE ENCOUNTER
Aletha Melton MD   8/24/2022  6:18 PM CDT Back to Top        We are on the right direction, please decrase methimazole from 20 mg daily to 10 mg daily (half dose) then repeat blood work in 2 month.      Aeltha Melton MD        Notes and new med orders sent via [employer].   Eloisa Hooper RN on 9/6/2022 at 11:38 AM

## 2022-09-06 NOTE — TELEPHONE ENCOUNTER
Dosage clarification     Aletha Melton MD  You 4 days ago     TA    She can stop propranolol completely.   Could you please tell her?      Spoke w/ Pt Via Yi speaking . Confirms understanding of review and recommendation from Dr Melton for reduced dosage of methimazole and stop propranolol. Confirms next lab draw at end of October.   Eloisa Hooper RN on 9/6/2022 at 9:31 AM

## 2023-04-24 ENCOUNTER — TELEPHONE (OUTPATIENT)
Dept: ENDOCRINOLOGY | Facility: CLINIC | Age: 28
End: 2023-04-24
Payer: COMMERCIAL

## 2023-04-24 NOTE — TELEPHONE ENCOUNTER
Appointment reminder phone call made to patient.   Patient stated she has a different Endocrinologist and no longer needing to see Dr. Melton.

## 2024-07-29 NOTE — TELEPHONE ENCOUNTER
----- Message from Aletha Melton MD sent at 8/24/2022  6:18 PM CDT -----  We are on the right direction, please decrase methimazole from 20 mg daily to 10 mg daily (half dose) then repeat blood work in 2 month.     Aletha Melton MD    Patient does not my chart pls communicate.    29-Jul-2024 06:34